# Patient Record
Sex: FEMALE | Race: WHITE | NOT HISPANIC OR LATINO | Employment: FULL TIME | URBAN - NONMETROPOLITAN AREA
[De-identification: names, ages, dates, MRNs, and addresses within clinical notes are randomized per-mention and may not be internally consistent; named-entity substitution may affect disease eponyms.]

---

## 2017-12-13 ENCOUNTER — HISTORY (OUTPATIENT)
Dept: OBGYN | Facility: OTHER | Age: 52
End: 2017-12-13

## 2017-12-13 ENCOUNTER — OFFICE VISIT - GICH (OUTPATIENT)
Dept: OBGYN | Facility: OTHER | Age: 52
End: 2017-12-13

## 2017-12-13 DIAGNOSIS — Z00.00 ENCOUNTER FOR GENERAL ADULT MEDICAL EXAMINATION WITHOUT ABNORMAL FINDINGS: ICD-10-CM

## 2017-12-13 DIAGNOSIS — D25.9 LEIOMYOMA OF UTERUS: ICD-10-CM

## 2017-12-13 DIAGNOSIS — Z78.0 ASYMPTOMATIC MENOPAUSAL STATE: ICD-10-CM

## 2017-12-13 LAB — FSH - HISTORICAL: 33.6 MIU/ML

## 2017-12-20 ENCOUNTER — HOSPITAL ENCOUNTER (OUTPATIENT)
Dept: RADIOLOGY | Facility: OTHER | Age: 52
End: 2017-12-20
Attending: OBSTETRICS & GYNECOLOGY

## 2017-12-20 DIAGNOSIS — D25.9 LEIOMYOMA OF UTERUS: ICD-10-CM

## 2017-12-26 ENCOUNTER — OFFICE VISIT - GICH (OUTPATIENT)
Dept: OBGYN | Facility: OTHER | Age: 52
End: 2017-12-26

## 2017-12-26 ENCOUNTER — HISTORY (OUTPATIENT)
Dept: OBGYN | Facility: OTHER | Age: 52
End: 2017-12-26

## 2017-12-26 DIAGNOSIS — N83.201 CYST OF RIGHT OVARY: ICD-10-CM

## 2017-12-26 DIAGNOSIS — Z78.0 ASYMPTOMATIC MENOPAUSAL STATE: ICD-10-CM

## 2017-12-26 DIAGNOSIS — D25.9 LEIOMYOMA OF UTERUS: ICD-10-CM

## 2018-02-09 VITALS
DIASTOLIC BLOOD PRESSURE: 80 MMHG | SYSTOLIC BLOOD PRESSURE: 114 MMHG | WEIGHT: 205 LBS | HEART RATE: 80 BPM | BODY MASS INDEX: 34.11 KG/M2

## 2018-02-09 VITALS
DIASTOLIC BLOOD PRESSURE: 90 MMHG | SYSTOLIC BLOOD PRESSURE: 130 MMHG | HEIGHT: 65 IN | HEART RATE: 80 BPM | WEIGHT: 203.2 LBS | BODY MASS INDEX: 33.85 KG/M2

## 2018-02-12 NOTE — PROGRESS NOTES
Patient Information     Patient Name MRN Sex Megan Mcbride [id number] Female 1965      Progress Notes by Demetrio Elias MD at 2017 11:15 AM     Author:  Demetrio Elias MD Service:  (none) Author Type:  Physician     Filed:  2017  1:26 PM Encounter Date:  2017 Status:  Signed     :  Demetrio Elias MD (Physician)            SUBJECTIVE:    Megan Garbre is a 52 y.o. female who presents for f/u of a uterine hypoechoic growth seen on u/s 2016    Gyn Exam   The patient's primary symptoms include missed menses. The patient's pertinent negatives include no genital itching, genital lesions, genital odor, genital rash, vaginal bleeding or vaginal discharge. This is a new problem. The current episode started more than 1 year ago. The patient is experiencing no pain. She is not pregnant. Associated symptoms include back pain. Pertinent negatives include no abdominal pain, anorexia, chills, constipation, diarrhea, discolored urine, dysuria, fever, flank pain, frequency, headaches, nausea, painful intercourse, rash, urgency or vomiting. She is sexually active. No, her partner does not have an STD. She uses vasectomy for contraception. There is no history of an abdominal surgery, a  section, an ectopic pregnancy, endometriosis, a gynecological surgery, herpes simplex, menorrhagia, metrorrhagia, miscarriage, ovarian cysts, PID or an STD.       No Known Allergies,   Family History      Problem  Relation Age of Onset     Cancer-breast No Family History    ,   Current Outpatient Prescriptions:      ibuprofen (ADVIL; MOTRIN) 800 mg tablet, TAKE ONE TABLET BY MOUTH EVERY 8 HOURS AS NEEDED FOR PAIN .  TAKE  WITH  FOOD., Disp: , Rfl:      sertraline (ZOLOFT) 100 mg tablet, Take 1 tablet by mouth once daily., Disp: , Rfl: 0  Medications have been reviewed by me and are current to the best of my knowledge and ability., No past medical history on file., There are no active problems to  "display for this patient.  , No past surgical history on file. and   Social History     Substance Use Topics       Smoking status: Never Smoker     Smokeless tobacco: Never Used     Alcohol use Yes       REVIEW OF SYSTEMS:  Review of Systems   Constitutional: Negative for chills and fever.   Cardiovascular: Negative for chest pain and palpitations.   Gastrointestinal: Negative for abdominal pain, anorexia, constipation, diarrhea, nausea and vomiting.   Genitourinary: Positive for missed menses. Negative for dysuria, flank pain, frequency, menorrhagia, urgency and vaginal discharge.   Musculoskeletal: Positive for back pain.   Skin: Negative for itching and rash.   Neurological: Negative for headaches.       OBJECTIVE:  /90 (Cuff Site: Right Arm, Position: Sitting, Cuff Size: Adult Large)  Pulse 80  Ht 1.651 m (5' 5\")  Wt 92.2 kg (203 lb 3.2 oz)  LMP 07/01/2017  Breastfeeding? No  BMI 33.81 kg/m2    EXAM:   Physical Exam   Constitutional: She is oriented to person, place, and time and well-developed, well-nourished, and in no distress.   HENT:   Head: Normocephalic and atraumatic.   Eyes: Pupils are equal, round, and reactive to light.   Abdominal: Soft. She exhibits no distension and no mass. There is no tenderness. There is no rebound and no guarding.   Genitourinary: Vagina normal, uterus normal, cervix normal, right adnexa normal, left adnexa normal and vulva normal.   Genitourinary Comments: Pap smear obtained.   Neurological: She is alert and oriented to person, place, and time.   Skin: Skin is warm and dry.   Psychiatric: Mood, memory, affect and judgment normal.       ASSESSMENT/PLAN:  1. H/O uterine growth (solitary fibroid)  2. Requests pap smear  3. Menopausal symptoms.     Plan:  Repeat u/s to assess for any interval change as last u/s is almost 2 years old.  FSH level to confirm menopause.          "

## 2018-02-12 NOTE — PROGRESS NOTES
Patient Information     Patient Name MRN Sex Megan Mcbride [id number] Female 1965      Progress Notes by Demetrio Elias MD at 2017 10:45 AM     Author:  Demetrio Elias MD Service:  (none) Author Type:  Physician     Filed:  2017 12:03 PM Encounter Date:  2017 Status:  Signed     :  Demetrio Elias MD (Physician)            PROGRESS NOTE    SUBJECTIVE:  Megan returns to review her test results.  FSH level was 33 and is in the menopausal range.  She is amenorrheic at this time.  Pap smear is still pending.  Her u/s found a 2.2 cm intramural fibroid, endometrial thickness of 8 mm and a 3.4 cm simple cyst of the right ovary. She has no pelvic complaints.    OBJECTIVE:  /80 (Cuff Site: Right Arm, Position: Sitting, Cuff Size: Adult Large)  Pulse 80  Wt 93 kg (205 lb)  Breastfeeding? No  BMI 34.11 kg/m2    PHYSICAL EXAM:  GENERAL APPEARANCE: Megan Garber is a normal appearing 52 y.o. female who is appropriate and cooperative.  Patient is neat and clean, and looks their stated age.  Patient is awake and alert, comfortable and in no acute distress while sitting up.       PROCEDURE: US PELVIS COMPLETE TA AND TV     HISTORY: Uterine leiomyoma, unspecified location.     TECHNIQUE: Transabdominal and transvaginal ultrasound of the pelvis.     COMPARISON: None.     FINDINGS:      Uterus: There is a 1.8 x 1.7 x 2.2 cm fibroid in the fundus, without contact of the endometrium. There are several nabothian cysts in the cervix. .  Endometrium: 8 mm.     Right ovary: There is a simple cyst measuring 3.4 x 2.9 x 2.9 cm.  Right ovary size: 3.7 x 4.2 x 3.6 cm     Left ovary: Unremarkable  Left ovary size: 1.9 x 1.8 x 1.4 cm     No free fluid is seen.     IMPRESSION:   1. 2.2 cm uterine fibroid.  2. 3.4 cm right ovarian cyst.     Electronically Signed By: Shawnee Nielson M.D. on 2017 8:18 AM    FSH mIU/mL 33.6   Resulting Agency  [facility]   Narrative   Female: Mid-Follicular Phase    (3.85-8.78)          Mid-Cycle Peak         (4.54-22.51)          Mid-Luteal Phase       (1.79-5.12)          Postmenopausal         (16..59)            ASSESSMENT & PLAN:   1. Uterine fibroid, intramural with no symptoms, no need for intervention  2. Amenorrhea, elevated FSH suggesting menopause.  No need for intervention.  3. Right Ovarian cyst, simple.  Repeat pelvic u/s in 6 months to ensure decrease or no change.  She'll call to schedule repeat u/s prior to her visit in 6 months.     Demetrio Elias MD ....................  12/26/2017   12:01 PM

## 2018-02-12 NOTE — NURSING NOTE
Patient Information     Patient Name MRN Sex Megan Mcbride [id number] Female 1965      Nursing Note by Dieudonne Will LPN at 2017 11:15 AM     Author:  Dieudonne Will LPN  Service:  (none) Author Type:  NURS- Licensed Practical Nurse     Filed:  2017 11:19 AM  Encounter Date:  2017 Status:  Addendum     :  Dieudonne Will LPN (NURS- Licensed Practical Nurse)        Related Notes: Original Note by Dieudonne Will LPN (NURS- Licensed Practical Nurse) filed at 2017 11:16 AM            Patient presents to the clinic for an ovarian cyst consult. Patient would like to get a pap done and discuss menopause. Patient has not had a period since 2017.  Dieudonne Will LPN ..............2017 11:16 AM

## 2018-02-12 NOTE — NURSING NOTE
Patient Information     Patient Name MRN Sex Megan Mcbride [id number] Female 1965      Nursing Note by Jadyn Chapin at 2017 10:45 AM     Author:  Jadyn Chapin Service:  (none) Author Type:  (none)     Filed:  2017 10:48 AM Encounter Date:  2017 Status:  Signed     :  Jadyn Chapin            She is here today to follow up after an ultrasound.  Jadyn Chapin LPN..................2017   10:45 AM

## 2018-02-14 ENCOUNTER — DOCUMENTATION ONLY (OUTPATIENT)
Dept: FAMILY MEDICINE | Facility: OTHER | Age: 53
End: 2018-02-14

## 2018-02-14 RX ORDER — SERTRALINE HYDROCHLORIDE 100 MG/1
100 TABLET, FILM COATED ORAL DAILY
COMMUNITY
Start: 2015-03-23

## 2018-02-14 RX ORDER — IBUPROFEN 800 MG/1
800 TABLET, FILM COATED ORAL EVERY 8 HOURS PRN
COMMUNITY
Start: 2017-04-06

## 2018-07-23 NOTE — PROGRESS NOTES
Patient Information     Patient Name  Megan Garber MRN  [id number] Sex  Female   1965      Letter by Demetrio Elias MD at      Author:  Demetrio Elias MD Service:  (none) Author Type:  (none)    Filed:   Encounter Date:  2017 Status:  (Other)           Megan Garber  [id number] [facility] [address]          2018    Dear Ms. Garber:    Following are the tests completed during your last clinic visit.  The results of these tests are normal and require no further attention.    Pap smear    If you have any further questions or problems contact my office at  [phone]    Thank you,    Erin MCCABE RN  Registered Nurse for [facility] providers .    Dr Demetrio Elias MD, FACOG

## 2019-02-20 ENCOUNTER — HOSPITAL ENCOUNTER (OUTPATIENT)
Dept: MAMMOGRAPHY | Facility: OTHER | Age: 54
Discharge: HOME | End: 2019-02-20
Attending: OBSTETRICS & GYNECOLOGY | Admitting: OBSTETRICS & GYNECOLOGY
Payer: COMMERCIAL

## 2019-02-20 DIAGNOSIS — Z12.39 SCREENING BREAST EXAMINATION: ICD-10-CM

## 2019-02-20 PROCEDURE — 77063 BREAST TOMOSYNTHESIS BI: CPT

## 2021-01-28 ENCOUNTER — IMMUNIZATION (OUTPATIENT)
Dept: FAMILY MEDICINE | Facility: OTHER | Age: 56
End: 2021-01-28
Attending: FAMILY MEDICINE
Payer: COMMERCIAL

## 2021-01-28 PROCEDURE — 91300 PR COVID VAC PFIZER DIL RECON 30 MCG/0.3 ML IM: CPT

## 2021-01-28 PROCEDURE — 0001A PR COVID VAC PFIZER DIL RECON 30 MCG/0.3 ML IM: CPT

## 2021-02-18 ENCOUNTER — IMMUNIZATION (OUTPATIENT)
Dept: FAMILY MEDICINE | Facility: OTHER | Age: 56
End: 2021-02-18
Attending: FAMILY MEDICINE
Payer: COMMERCIAL

## 2021-02-18 ENCOUNTER — HOSPITAL ENCOUNTER (OUTPATIENT)
Dept: MAMMOGRAPHY | Facility: OTHER | Age: 56
End: 2021-02-18
Attending: NURSE PRACTITIONER
Payer: COMMERCIAL

## 2021-02-18 DIAGNOSIS — Z12.31 VISIT FOR SCREENING MAMMOGRAM: ICD-10-CM

## 2021-02-18 PROCEDURE — 77063 BREAST TOMOSYNTHESIS BI: CPT

## 2021-02-18 PROCEDURE — 0002A PR COVID VAC PFIZER DIL RECON 30 MCG/0.3 ML IM: CPT

## 2021-02-18 PROCEDURE — 91300 PR COVID VAC PFIZER DIL RECON 30 MCG/0.3 ML IM: CPT

## 2022-08-15 ENCOUNTER — HOSPITAL ENCOUNTER (OUTPATIENT)
Dept: MAMMOGRAPHY | Facility: OTHER | Age: 57
Discharge: HOME | End: 2022-08-15
Attending: FAMILY MEDICINE | Admitting: FAMILY MEDICINE
Payer: COMMERCIAL

## 2022-08-15 DIAGNOSIS — Z12.31 VISIT FOR SCREENING MAMMOGRAM: ICD-10-CM

## 2022-08-15 PROCEDURE — 77067 SCR MAMMO BI INCL CAD: CPT

## 2022-08-22 ENCOUNTER — OFFICE VISIT (OUTPATIENT)
Dept: FAMILY MEDICINE | Facility: OTHER | Age: 57
End: 2022-08-22
Attending: NURSE PRACTITIONER
Payer: COMMERCIAL

## 2022-08-22 VITALS
HEART RATE: 75 BPM | SYSTOLIC BLOOD PRESSURE: 126 MMHG | WEIGHT: 191.13 LBS | DIASTOLIC BLOOD PRESSURE: 70 MMHG | TEMPERATURE: 98.4 F | BODY MASS INDEX: 31.8 KG/M2 | OXYGEN SATURATION: 98 % | RESPIRATION RATE: 18 BRPM

## 2022-08-22 DIAGNOSIS — J02.9 PHARYNGITIS, UNSPECIFIED ETIOLOGY: Primary | ICD-10-CM

## 2022-08-22 PROBLEM — E66.811 OBESITY (BMI 30.0-34.9): Status: ACTIVE | Noted: 2017-06-06

## 2022-08-22 PROBLEM — D68.51 HETEROZYGOUS FACTOR V LEIDEN MUTATION (H): Status: ACTIVE | Noted: 2017-06-09

## 2022-08-22 PROCEDURE — 99213 OFFICE O/P EST LOW 20 MIN: CPT | Performed by: PHYSICIAN ASSISTANT

## 2022-08-22 RX ORDER — TRIAMCINOLONE ACETONIDE 5 MG/G
CREAM TOPICAL
COMMUNITY
Start: 2021-04-16

## 2022-08-22 RX ORDER — ESTRADIOL 0.1 MG/G
CREAM VAGINAL
COMMUNITY
Start: 2022-08-12

## 2022-08-22 RX ORDER — AMOXICILLIN 500 MG/1
500 CAPSULE ORAL 2 TIMES DAILY
Qty: 20 CAPSULE | Refills: 0 | Status: SHIPPED | OUTPATIENT
Start: 2022-08-22 | End: 2022-11-05

## 2022-08-22 RX ORDER — OXYBUTYNIN CHLORIDE 5 MG/1
TABLET, EXTENDED RELEASE ORAL
COMMUNITY
Start: 2022-08-12

## 2022-08-22 ASSESSMENT — PAIN SCALES - GENERAL: PAINLEVEL: SEVERE PAIN (7)

## 2022-08-22 NOTE — PROGRESS NOTES
Assessment & Plan     1. Pharyngitis, unspecified etiology  Differential includes viral or strep pharyngitis, viral URI, COVID, influenza, RSV, asthma, allergies, GERD, esophagitis, postnasal drip, sinusitis, etc.  Due to longevity of symptoms of 4+ weeks we will treat with amoxicillin as outlined below.  Educated on medication, use, side effects.  Patient declined strep and COVID testing.  Follow-up as needed.  - amoxicillin (AMOXIL) 500 MG capsule; Take 1 capsule (500 mg) by mouth 2 times daily  Dispense: 20 capsule; Refill: 0    Return if symptoms worsen or fail to improve.    Myra Leong PA-C  [facility]    Subjective   Megan is a 57 year old, presenting for the following health issues:  Pharyngitis (Cough, sinus drainage, bilateral ear pain, fatigue x 3 weeks)      HPI   Here for evaluation of a sore throat that has been present for a little in 4 weeks.  Has had an associated on and off cough.  Associated head congestion or pressure.  No associated fever/chills, difficulties breathing, body aches, GI symptoms, rash.  Managing symptoms with NyQuil and ibuprofen which does provide some relief.  No known sick contacts.      PAST MEDICAL HISTORY: History reviewed. No pertinent past medical history.    PAST SURGICAL HISTORY: History reviewed. No pertinent surgical history.    FAMILY HISTORY:   Family History   Problem Relation Age of Onset     Breast Cancer No family hx of         Cancer-breast       SOCIAL HISTORY:   Social History     Tobacco Use     Smoking status: Never Smoker     Smokeless tobacco: Never Used   Substance Use Topics     Alcohol use: Yes      No Known Allergies  Current Outpatient Medications   Medication     amoxicillin (AMOXIL) 500 MG capsule     estradiol (ESTRACE) 0.1 MG/GM vaginal cream     ibuprofen (ADVIL/MOTRIN) 800 MG tablet     sertraline (ZOLOFT) 100 MG tablet     triamcinolone (ARISTOCORT HP) 0.5 % external cream     oxybutynin ER (DITROPAN XL) 5 MG 24 hr  tablet     No current facility-administered medications for this visit.         Review of Systems   Per HPI        Objective    /70 (BP Location: Right arm, Patient Position: Sitting, Cuff Size: Adult Large)   Pulse 75   Temp 98.4  F (36.9  C) (Tympanic)   Resp 18   Wt 86.7 kg (191 lb 2 oz)   LMP  (LMP Unknown)   SpO2 98%   Breastfeeding No   BMI 31.80 kg/m    Body mass index is 31.8 kg/m .  Physical Exam   General: Pleasant, in no apparent distress.  Eyes: Sclera are white and conjunctiva are clear bilaterally. Lacrimal apparatus free of erythema, edema, and discharge bilaterally.  Ears: External ears without erythema or edema. Tympanic membranes are pearly white and without erythema, scarring or perforations bilaterally. External auditory canals are free of foreign bodies, erythema, ulcers, and masses.  Nose: External nose is symmetrical and free of lesions and deformities.   Oropharynx: Oral mucosa is pink and without ulcers, nodules, and white patches. Tongue is symmetrical, pink, and without masses or lesions. Pharynx is erythematous, symmetrical, and without lesions. Uvula is midline. Tonsils are pink, symmetrical, and without edema, ulcers, or exudates, and 1+ bilaterally.  Cardiovascular: Regular rate and rhythm with S1 equal to S2. No murmurs, friction rubs, or gallops.   Respiratory: Lungs are resonant and clear to auscultation bilaterally. No wheezes, crackles, or rhonchi.  Psych: Appropriate mood and affect.

## 2022-08-22 NOTE — NURSING NOTE
Patient presents to clinic experiencing sore throat, cough, bilateral ear pain, fatigue and sinus drainage for past 3 weeks.  Medication Rec Complete  Shawnee Martinez LPN............8/22/2022 1:59 PM

## 2022-11-05 ENCOUNTER — HOSPITAL ENCOUNTER (EMERGENCY)
Facility: HOSPITAL | Age: 57
Discharge: HOME | End: 2022-11-05
Attending: NURSE PRACTITIONER | Admitting: NURSE PRACTITIONER
Payer: COMMERCIAL

## 2022-11-05 VITALS
TEMPERATURE: 101.7 F | RESPIRATION RATE: 18 BRPM | DIASTOLIC BLOOD PRESSURE: 99 MMHG | HEART RATE: 98 BPM | OXYGEN SATURATION: 98 % | SYSTOLIC BLOOD PRESSURE: 161 MMHG

## 2022-11-05 DIAGNOSIS — J02.0 STREP THROAT: Primary | ICD-10-CM

## 2022-11-05 DIAGNOSIS — J02.9 ACUTE PHARYNGITIS: ICD-10-CM

## 2022-11-05 LAB — GROUP A STREP BY PCR: DETECTED

## 2022-11-05 PROCEDURE — 87651 STREP A DNA AMP PROBE: CPT | Performed by: NURSE PRACTITIONER

## 2022-11-05 PROCEDURE — G0463 HOSPITAL OUTPT CLINIC VISIT: HCPCS

## 2022-11-05 PROCEDURE — 99213 OFFICE O/P EST LOW 20 MIN: CPT | Performed by: NURSE PRACTITIONER

## 2022-11-05 RX ORDER — AMOXICILLIN 500 MG/1
1000 CAPSULE ORAL 2 TIMES DAILY
Qty: 40 CAPSULE | Refills: 0 | Status: SHIPPED | OUTPATIENT
Start: 2022-11-05 | End: 2022-11-15

## 2022-11-05 ASSESSMENT — ENCOUNTER SYMPTOMS
FEVER: 1
VOICE CHANGE: 0
COUGH: 0
TROUBLE SWALLOWING: 1
SORE THROAT: 1
HEADACHES: 1
SHORTNESS OF BREATH: 0

## 2022-11-05 NOTE — DISCHARGE INSTRUCTIONS
Continue taking Tylenol and ibuprofen as needed for the pain or fever.  Drink plenty of fluids.  Do warm salt water gargles.    We will call you notify you of your results.    Follow-up with your doctor if no improvement in symptoms.    Return to emergency department for worsening or concerning symptoms.

## 2022-11-05 NOTE — ED PROVIDER NOTES
History     Chief Complaint   Patient presents with     Pharyngitis     HPI  Megan Garber is a 57 year old female who presents ambulatory to urgent care for evaluation of sore throat.  Patient tells me that she started having a headache about 5 days ago.  Over the last few days she she has since developed some dry eyes, throat discomfort and chills.  Patient tells me that she initially thought that some of her symptoms were due to oxybutynin.  Now she notes that it is very painful for her to swallow and she has a white spots to the back of her throat.  She was noted to have a fever upon arrival to this facility.  She denies any cough, shortness of breath or chest pain.  Patient declines COVID-19 testing at this time.    She has been taking Tylenol and ibuprofen with last doses taken around 830 today.  She declines any medication for pain or fever during this visit.    Allergies:  No Known Allergies    Problem List:    Patient Active Problem List    Diagnosis Date Noted     Heterozygous factor V Leiden mutation (H) 06/09/2017     Priority: Medium     Obesity (BMI 30.0-34.9) 06/06/2017     Priority: Medium     Anxiety 01/26/2016     Priority: Medium     Dysthymic disorder 08/17/2011     Priority: Medium     Family history of diabetes mellitus 12/21/2009     Priority: Medium     Migraine without intractable migraine 12/21/2009     Priority: Medium     Formatting of this note might be different from the original.  IMO Update 10/11          Past Medical History:    History reviewed. No pertinent past medical history.    Past Surgical History:    History reviewed. No pertinent surgical history.    Family History:    Family History   Problem Relation Age of Onset     Breast Cancer No family hx of         Cancer-breast       Social History:  Marital Status:   [2]  Social History     Tobacco Use     Smoking status: Never     Smokeless tobacco: Never   Vaping Use     Vaping Use: Never used   Substance Use Topics      Alcohol use: Yes     Drug use: Never     Comment: Drug use: No        Medications:    amoxicillin (AMOXIL) 500 MG capsule  estradiol (ESTRACE) 0.1 MG/GM vaginal cream  ibuprofen (ADVIL/MOTRIN) 800 MG tablet  oxybutynin ER (DITROPAN XL) 5 MG 24 hr tablet  sertraline (ZOLOFT) 100 MG tablet  triamcinolone (ARISTOCORT HP) 0.5 % external cream          Review of Systems   Constitutional: Positive for fever.   HENT: Positive for sore throat and trouble swallowing. Negative for voice change.    Respiratory: Negative for cough and shortness of breath.    Neurological: Positive for headaches.   All other systems reviewed and are negative.      Physical Exam   BP: 161/99  Pulse: 98  Temp: (!) 101.7  F (38.7  C)  Resp: 18  SpO2: 98 %      Physical Exam  Vitals and nursing note reviewed.   Constitutional:       Appearance: She is well-developed. She is not ill-appearing.   HENT:      Head: Atraumatic.      Right Ear: Tympanic membrane and ear canal normal.      Left Ear: Tympanic membrane and ear canal normal.      Mouth/Throat:      Mouth: Mucous membranes are moist.      Pharynx: Uvula midline. Posterior oropharyngeal erythema present. No uvula swelling.      Tonsils: Tonsillar exudate present. No tonsillar abscesses. 1+ on the right. 2+ on the left.   Eyes:      Pupils: Pupils are equal, round, and reactive to light.   Cardiovascular:      Rate and Rhythm: Normal rate and regular rhythm.   Pulmonary:      Effort: Pulmonary effort is normal. No respiratory distress.      Breath sounds: Normal breath sounds.   Musculoskeletal:      Cervical back: Neck supple.   Lymphadenopathy:      Cervical: Cervical adenopathy present.   Skin:     General: Skin is warm.      Capillary Refill: Capillary refill takes less than 2 seconds.   Neurological:      Mental Status: She is alert and oriented to person, place, and time.         ED Course                 Procedures              Results for orders placed or performed during the hospital  encounter of 11/05/22 (from the past 24 hour(s))   Group A Streptococcus PCR Throat Swab    Specimen: Throat; Swab   Result Value Ref Range    Group A strep by PCR Detected (A) Not Detected    Narrative    The Xpert Xpress Strep A test, performed on the [employer], is a rapid, qualitative in vitro diagnostic test for the detection of Streptococcus pyogenes (Group A ß-hemolytic Streptococcus, Strep A) in throat swab specimens from patients with signs and symptoms of pharyngitis. The Xpert Xpress Strep A test can be used as an aid in the diagnosis of Group A Streptococcal pharyngitis. The assay is not intended to monitor treatment for Group A Streptococcus infections. The Xpert Xpress Strep A test utilizes an automated real-time polymerase chain reaction (PCR) to detect Streptococcus pyogenes DNA.       Medications - No data to display    Assessments & Plan (with Medical Decision Making)     I have reviewed the nursing notes.    57-year-old female that presented for evaluation of a sore throat with symptoms having initially started about 4 days ago.  Patient noted to have a fever upon arrival to this facility.  She does have bilateral tonsillar hypertrophy with exudate.  No trismus.  No obvious tonsillar abscess.  Patient declined any medication for her fever or pain during this visit.  She did test positive for strep.  Amoxicillin as prescribed.  Recommended continue with Tylenol or ibuprofen as needed for pain or fever.  Push fluids.  Warm salt water gargles.  Follow-up with primary medical provider as needed.  Return to ED/UC for worsening or concerning symptoms.    I have reviewed the findings, diagnosis, plan and need for follow up with the patient.  This document was prepared using a combination of typing and voice generated software.  While every attempt was made for accuracy, spelling and grammatical errors may exist.    Discharge Medication List as of 11/5/2022  2:13 PM          Final  diagnoses:   Acute pharyngitis   Strep throat       11/5/2022   [facility]     Mpofu, Prudence, CNP  11/05/22 [id number]

## 2022-11-05 NOTE — ED TRIAGE NOTES
Patient presents today with a sore throat and chills since Tuesday. She is febrile and just generally doesn't feel well. No SOB or distress noted.      Triage Assessment     Row Name 11/05/22 [id number]       Triage Assessment (Adult)    Airway WDL WDL       Respiratory WDL    Respiratory WDL WDL       Skin Circulation/Temperature WDL    Skin Circulation/Temperature WDL WDL       Cardiac WDL    Cardiac WDL WDL       Peripheral/Neurovascular WDL    Peripheral Neurovascular WDL WDL       Cognitive/Neuro/Behavioral WDL    Cognitive/Neuro/Behavioral WDL WDL

## 2022-11-05 NOTE — ED TRIAGE NOTES
Pt presents with c/o fever (did not take temp at home), sore throat, states there are white patches all over throat. Sx started Tuesday. Pt states she has been taking tylenol and ibuprofen. Pt had ibuprofen and one 200mg tylenol at 0830 am.      Triage Assessment     Row Name 11/05/22 [id number]       Triage Assessment (Adult)    Airway WDL WDL       Respiratory WDL    Respiratory WDL WDL       Skin Circulation/Temperature WDL    Skin Circulation/Temperature WDL WDL       Cardiac WDL    Cardiac WDL WDL       Peripheral/Neurovascular WDL    Peripheral Neurovascular WDL WDL       Cognitive/Neuro/Behavioral WDL    Cognitive/Neuro/Behavioral WDL WDL

## 2023-03-21 ENCOUNTER — OFFICE VISIT (OUTPATIENT)
Dept: FAMILY MEDICINE | Facility: OTHER | Age: 58
End: 2023-03-21
Attending: NURSE PRACTITIONER
Payer: COMMERCIAL

## 2023-03-21 VITALS
HEART RATE: 79 BPM | OXYGEN SATURATION: 98 % | RESPIRATION RATE: 16 BRPM | TEMPERATURE: 98 F | DIASTOLIC BLOOD PRESSURE: 88 MMHG | HEIGHT: 65 IN | WEIGHT: 190 LBS | BODY MASS INDEX: 31.65 KG/M2 | SYSTOLIC BLOOD PRESSURE: 132 MMHG

## 2023-03-21 DIAGNOSIS — R43.2 LOSS OF TASTE: ICD-10-CM

## 2023-03-21 DIAGNOSIS — J34.89 PAIN OF MAXILLARY SINUS: ICD-10-CM

## 2023-03-21 DIAGNOSIS — R43.0 LOSS OF SMELL: Primary | ICD-10-CM

## 2023-03-21 DIAGNOSIS — J01.90 ACUTE SINUSITIS WITH SYMPTOMS > 10 DAYS: ICD-10-CM

## 2023-03-21 DIAGNOSIS — R09.81 NASAL CONGESTION: ICD-10-CM

## 2023-03-21 LAB — SARS-COV-2 RNA RESP QL NAA+PROBE: NEGATIVE

## 2023-03-21 PROCEDURE — 99213 OFFICE O/P EST LOW 20 MIN: CPT | Mod: CS | Performed by: NURSE PRACTITIONER

## 2023-03-21 PROCEDURE — U0005 INFEC AGEN DETEC AMPLI PROBE: HCPCS | Mod: ZL | Performed by: NURSE PRACTITIONER

## 2023-03-21 PROCEDURE — C9803 HOPD COVID-19 SPEC COLLECT: HCPCS | Performed by: NURSE PRACTITIONER

## 2023-03-21 ASSESSMENT — PAIN SCALES - GENERAL: PAINLEVEL: SEVERE PAIN (6)

## 2023-03-21 NOTE — PROGRESS NOTES
ASSESSMENT/PLAN:  I have reviewed the nursing notes.  I have reviewed the findings, diagnosis, plan and need for follow up with the patient.    1. Loss of smell  2. Loss of taste  3. Nasal congestion  4. Pain of maxillary sinus  - Symptomatic COVID-19 Virus (Coronavirus) by PCR Nose  -Symptomatic treatment - Encouraged fluids, salt water gargles, honey (only if greater than 1 year in age due to risk of botulism), elevation, humidifier, sinus rinse/netti pot, lozenges, tea, topical vapor rub, popsicles, rest, etc   -May use over-the-counter Tylenol or ibuprofen PRN    5. Acute sinusitis with symptoms > 10 days  Patient has had 2 negative home COVID test as well as 1 negative COVID test here.  Given that she does have exam findings and history consistent with acute sinusitis and double sickening lasting greater than 10 days I did opt to prescribe antibiotic.  We discussed risk of antibiotic resistance and the potential that this could be caused a viral illness like COVID despite 3 negative COVID tests given her loss of taste and smell.  At this point I feel benefit outweighs the risk for trial of antibiotics.  Take full course.  Continue Arco pot/saline rinses and also recommend Flonase nasal steroid.  - amoxicillin-clavulanate (AUGMENTIN) 875-125 MG tablet; Take 1 tablet by mouth 2 times daily for 10 days  Dispense: 20 tablet; Refill: 0    Follow up if symptoms persist or worsen or concerns    I explained my diagnostic considerations and recommendations to the patient, who voiced understanding and agreement with the treatment plan. All questions were answered. We discussed potential side effects of any prescribed or recommended therapies, as well as expectations for response to treatments.    Jania Castanon NP  3/21/2023  11:12 AM    HPI:  Megan Garber is a 58 year old female who presents to [facility] today for concerns of severe nasal congestion x10 days. Fever (up to 99 only) started last night and had  "chills. No fever or chills prior to last night. She reports complete loss of taste and smell x6 days, which has never happened to her before. Some dry cough as well without shortness of breath. No prior history of sinus infections. She had 2 Neg covid test on Saturday 3/18 as well as 3/20 at home. Teeth also hurt. Worse on the left.     She thought she was improving for 2 days or so before sinus pain returned and was worse.  She has taken day quil and other OTC meds. Used netti pot yesterday.      is not sick. Megan works at a school. Had sore throat which has gone away since. She has had strep before; states this does not feel at all like strep.     ROS otherwise negative.     No past medical history on file.  No past surgical history on file.  Social History     Tobacco Use     Smoking status: Never     Smokeless tobacco: Never   Substance Use Topics     Alcohol use: Yes     Current Outpatient Medications   Medication Sig Dispense Refill     amoxicillin-clavulanate (AUGMENTIN) 875-125 MG tablet Take 1 tablet by mouth 2 times daily for 10 days 20 tablet 0     estradiol (ESTRACE) 0.1 MG/GM vaginal cream INSERT 2 GRAMS INTRAVAGINALLY EACH NIGHT AT BEDTIME FOR 2 WEEKS, THEN DECREASE TO 1 GRAM TWICE A WEEK AT BEDTIME       ibuprofen (ADVIL/MOTRIN) 800 MG tablet Take 800 mg by mouth every 8 hours as needed       oxybutynin ER (DITROPAN XL) 5 MG 24 hr tablet        sertraline (ZOLOFT) 100 MG tablet Take 100 mg by mouth daily       triamcinolone (ARISTOCORT HP) 0.5 % external cream        No Known Allergies  Past medical history, past surgical history, current medications and allergies reviewed and accurate to the best of my knowledge.      ROS:  Refer to HPI    /88 (BP Location: Left arm, Patient Position: Sitting, Cuff Size: Adult Regular)   Pulse 79   Temp 98  F (36.7  C) (Tympanic)   Resp 16   Ht 1.638 m (5' 4.5\")   Wt 86.2 kg (190 lb)   LMP  (LMP Unknown)   SpO2 98%   BMI 32.11 kg/m  "     EXAM:  General Appearance: Well appearing 58 year old female, appropriate appearance for age. No acute distress   Ears: Left TM intact, translucent with bony landmarks appreciated, no erythema, + serous effusion, no bulging, no purulence.  Right TM intact, translucent with bony landmarks appreciated, no erythema, no effusion, no bulging, no purulence.  Left auditory canal clear.  Right auditory canal clear.  Normal external ears, non tender.  Eyes: conjunctivae normal without erythema or irritation, corneas clear, no drainage or crusting, no eyelid swelling, pupils equal   Oropharynx: moist mucous membranes, posterior pharynx without erythema, tonsils symmetric, no erythema, no exudates or petechiae, no post nasal drip seen, no trismus, voice clear.    Sinuses:  + bilateral sinus tenderness upon palpation of the maxillary sinuses L>R  Nose:  Bilateral nares: no erythema, no edema, no drainage or congestion   Neck: supple without adenopathy  Respiratory: normal chest wall and respirations.  Normal effort.  Clear to auscultation bilaterally, no wheezing, crackles or rhonchi.  No increased work of breathing.  No cough appreciated.  Cardiac: RRR with no murmurs  Psychological: normal affect, alert, oriented, and pleasant.     Results for orders placed or performed in visit on 03/21/23   Symptomatic COVID-19 Virus (Coronavirus) by PCR Nose     Status: Normal    Specimen: Nose; Swab   Result Value Ref Range    SARS CoV2 PCR Negative Negative    Narrative    Testing was performed using the Xpert Xpress SARS-CoV-2 Assay on the Cepheid Gene-Xpert Instrument Systems. Additional information about this Emergency Use Authorization (EUA) assay can be found via the Lab Guide. This test should be ordered for the detection of SARS-CoV-2 in individuals who meet SARS-CoV-2 clinical and/or epidemiological criteria as well as from individuals without symptoms or other reasons to suspect COVID-19. Test performance for asymptomatic  patients has only been established in anterior nasal swab specimens. This test is for in vitro diagnostic use under the FDA EUA for laboratories certified under CLIA to perform high complexity testing. This test has not been FDA cleared or approved. A negative result does not rule out the presence of PCR inhibitors in the specimen or target RNA concentration below the limit of detection for the assay. The possibility of a false negative should be considered if the patient's recent exposure or clinical presentation suggests COVID-19. This test was validated by [facility]. This laboratory is certified under the Clinical Laboratory Improvement Amendments (CLIA) as qualified to perform high complexity clinical laboratory testing.

## 2023-03-21 NOTE — NURSING NOTE
Chief Complaint   Patient presents with     Nasal Congestion     10 days - neg home covid Monday     Fever     Last night with chills     Cough     Loss of taste and smell x 6 days     Patient treating with ibuprofen and dayquil  Two neg covid tests Monday 3/20 and Sat 3/18  Loss of taste and smell 6 days ago    Advanced Care Planning on file? no    Medication Review Completed: complete    FOOD SECURITY SCREENING QUESTIONS:    The next two questions are to help us understand your food security.  If you are feeling you need any assistance in this area, we have resources available to support you today.    Hunger Vital Signs:  Within the past 12 months we worried whether our food would run out before we got money to buy more. Never  Within the past 12 months the food we bought just didn't last and we didn't have money to get more. Never    Livier Vo LPN

## 2023-03-21 NOTE — LETTER
March 21, 2023                                                                     To Whom it May Concern:    Megan TILLMAN Garber attended clinic here on Mar 21, 2023.       Sincerely,    Jania Castanon NP

## 2024-02-18 ENCOUNTER — HEALTH MAINTENANCE LETTER (OUTPATIENT)
Age: 59
End: 2024-02-18

## 2024-11-24 ENCOUNTER — HEALTH MAINTENANCE LETTER (OUTPATIENT)
Age: 59
End: 2024-11-24

## 2024-12-01 ENCOUNTER — OFFICE VISIT (OUTPATIENT)
Dept: FAMILY MEDICINE | Facility: OTHER | Age: 59
End: 2024-12-01
Attending: NURSE PRACTITIONER
Payer: COMMERCIAL

## 2024-12-01 VITALS
WEIGHT: 173 LBS | RESPIRATION RATE: 16 BRPM | TEMPERATURE: 98 F | SYSTOLIC BLOOD PRESSURE: 158 MMHG | OXYGEN SATURATION: 98 % | DIASTOLIC BLOOD PRESSURE: 108 MMHG | HEART RATE: 83 BPM | HEIGHT: 64 IN | BODY MASS INDEX: 29.53 KG/M2

## 2024-12-01 DIAGNOSIS — R35.0 URINARY FREQUENCY: ICD-10-CM

## 2024-12-01 DIAGNOSIS — R30.0 BURNING WITH URINATION: ICD-10-CM

## 2024-12-01 DIAGNOSIS — N30.01 ACUTE CYSTITIS WITH HEMATURIA: Primary | ICD-10-CM

## 2024-12-01 DIAGNOSIS — I10 BENIGN ESSENTIAL HYPERTENSION: ICD-10-CM

## 2024-12-01 LAB
ALBUMIN UR-MCNC: NEGATIVE MG/DL
APPEARANCE UR: CLEAR
BACTERIA #/AREA URNS HPF: ABNORMAL /HPF
BILIRUB UR QL STRIP: NEGATIVE
COLOR UR AUTO: ABNORMAL
GLUCOSE UR STRIP-MCNC: NEGATIVE MG/DL
HGB UR QL STRIP: ABNORMAL
KETONES UR STRIP-MCNC: NEGATIVE MG/DL
LEUKOCYTE ESTERASE UR QL STRIP: ABNORMAL
MUCOUS THREADS #/AREA URNS LPF: PRESENT /LPF
NITRATE UR QL: NEGATIVE
PH UR STRIP: 5 [PH] (ref 5–9)
RBC URINE: 16 /HPF
SP GR UR STRIP: 1.02 (ref 1–1.03)
SQUAMOUS EPITHELIAL: 1 /HPF
UROBILINOGEN UR STRIP-MCNC: NORMAL MG/DL
WBC URINE: 83 /HPF

## 2024-12-01 PROCEDURE — 81001 URINALYSIS AUTO W/SCOPE: CPT | Mod: ZL | Performed by: REGISTERED NURSE

## 2024-12-01 PROCEDURE — 99204 OFFICE O/P NEW MOD 45 MIN: CPT | Performed by: REGISTERED NURSE

## 2024-12-01 PROCEDURE — 87186 SC STD MICRODIL/AGAR DIL: CPT | Mod: ZL | Performed by: REGISTERED NURSE

## 2024-12-01 RX ORDER — HYDROCHLOROTHIAZIDE 12.5 MG/1
1 CAPSULE ORAL DAILY
COMMUNITY
Start: 2024-09-15

## 2024-12-01 RX ORDER — NITROFURANTOIN 25; 75 MG/1; MG/1
100 CAPSULE ORAL 2 TIMES DAILY
Qty: 10 CAPSULE | Refills: 0 | Status: SHIPPED | OUTPATIENT
Start: 2024-12-01 | End: 2024-12-06

## 2024-12-01 ASSESSMENT — PAIN SCALES - GENERAL: PAINLEVEL_OUTOF10: EXTREME PAIN (8)

## 2024-12-01 NOTE — NURSING NOTE
"Chief Complaint   Patient presents with    Urinary Problem     X 4 days    Urgency     Patient not tx symptoms today  Tx last night with tylenol pm    Initial BP (!) 158/108 (BP Location: Left arm, Patient Position: Sitting, Cuff Size: Adult Regular)   Pulse 83   Temp 98  F (36.7  C) (Tympanic)   Resp 16   Ht 1.626 m (5' 4\")   Wt 78.5 kg (173 lb)   LMP  (LMP Unknown)   SpO2 98%   BMI 29.70 kg/m   Estimated body mass index is 29.7 kg/m  as calculated from the following:    Height as of this encounter: 1.626 m (5' 4\").    Weight as of this encounter: 78.5 kg (173 lb).     Advance Care Directive on file? n    FOOD SECURITY SCREENING QUESTIONS:    The next two questions are to help us understand your food security.  If you are feeling you need any assistance in this area, we have resources available to support you today.    Hunger Vital Signs:  Within the past 12 months we worried whether our food would run out before we got money to buy more. Never  Within the past 12 months the food we bought just didn't last and we didn't have money to get more. Never  Livier Vo LPN,ZAC on 12/1/2024 at 1:58 PM      Livier Vo LPN     "

## 2024-12-01 NOTE — PATIENT INSTRUCTIONS
Start and finish antibiotic therapy. If your culture grows something not covered by this antibiotic you will be notified and your antibiotic will be changed at that time.    Push fluids.     Discuss your blood pressure tomorrow with your provider.

## 2024-12-01 NOTE — PROGRESS NOTES
Megan Garber  1965    ASSESSMENT/PLAN:   1. Acute cystitis with hematuria (Primary)  2. Burning with urination  3. Urinary frequency    - nitroFURantoin macrocrystal-monohydrate (MACROBID) 100 MG capsule; Take 1 capsule (100 mg) by mouth 2 times daily for 5 days.  Dispense: 10 capsule; Refill: 0  - UA Macroscopic with reflex to Microscopic and Culture  - Urine Culture    Urine analysis today showing active urinary tract infection.  Antibiotic therapy as noted above and follow urine culture.  Patient advised to push fluids and follow-up for any new or worsening concerns.    4.  Benign essential hypertension    Blood pressure elevated today.  Patient denies lightheadedness, dizziness, shortness of breath.  Has been taking hydrochlorothiazide as directed.  She has a wellness visit tomorrow with her PCP. Patient was advised to take her blood pressure tonight and again tomorrow morning and bring these numbers in to discuss further with her PCP.    Patient agrees with plan of care and verbalizes understating. AVS printed. Patient education provided verbally and written instructions provided as requested.     SUBJECTIVE:   CHIEF COMPLAINT/ REASON FOR VISIT  Patient presents with:  Urinary Problem: X 4 days  Urgency     HISTORY OF PRESENT ILLNESS  Megan Garber is a pleasant 59 year old female [facility] evaluation of dysuria, urgency and frequency for the past 4 days.  She has not had a recent UTI.  Denies fevers, recent diarrhea, CVA tenderness or suprapubic tenderness    Her blood pressure is elevated in clinic today.  She is surprised by this.  She has been taking her hydrochlorothiazide as directed.  Denies chest pain, lightheadedness, dizziness or shortness of breath.  She has a wellness appointment tomorrow with her PCP.        I have reviewed the nursing notes.  I have reviewed allergies, medication list, problem list, and past medical history.    REVIEW OF SYSTEMS  See HPI    VITAL  "SIGNS  Vitals:    12/01/24 1356 12/01/24 1358   BP: (!) 158/118 (!) 158/108   BP Location: Left arm Left arm   Patient Position: Sitting Sitting   Cuff Size: Adult Regular Adult Regular   Pulse: 83    Resp: 16    Temp: 98  F (36.7  C)    TempSrc: Tympanic    SpO2: 98%    Weight: 78.5 kg (173 lb)    Height: 1.626 m (5' 4\")       Body mass index is 29.7 kg/m .    OBJECTIVE:   PHYSICAL EXAM  Physical Exam  Vitals and nursing note reviewed.   Constitutional:       Appearance: Normal appearance. She is not ill-appearing.   Cardiovascular:      Rate and Rhythm: Normal rate and regular rhythm.   Pulmonary:      Effort: Pulmonary effort is normal.      Breath sounds: Normal breath sounds.   Abdominal:      General: Abdomen is flat. Bowel sounds are normal. There is no distension.      Tenderness: There is no abdominal tenderness. There is no right CVA tenderness or left CVA tenderness.   Neurological:      General: No focal deficit present.   Psychiatric:         Mood and Affect: Mood normal.          DIAGNOSTICS  Results for orders placed or performed in visit on 12/01/24   UA Macroscopic with reflex to Microscopic and Culture     Status: Abnormal    Specimen: Urine, Clean Catch   Result Value Ref Range    Color Urine Light Yellow Colorless, Straw, Light Yellow, Yellow    Appearance Urine Clear Clear    Glucose Urine Negative Negative mg/dL    Bilirubin Urine Negative Negative    Ketones Urine Negative Negative mg/dL    Specific Gravity Urine 1.016 1.000 - 1.030    Blood Urine Small (A) Negative    pH Urine 5.0 5.0 - 9.0    Protein Albumin Urine Negative Negative mg/dL    Urobilinogen Urine Normal Normal, 2.0 mg/dL    Nitrite Urine Negative Negative    Leukocyte Esterase Urine Large (A) Negative    Bacteria Urine Few (A) None Seen /HPF    Mucus Urine Present (A) None Seen /LPF    RBC Urine 16 (H) <=2 /HPF    WBC Urine 83 (H) <=5 /HPF    Squamous Epithelials Urine 1 <=1 /HPF    Narrative    Urine Culture ordered based on " laboratory criteria        ALEX Henderson [facility]

## 2024-12-03 LAB — BACTERIA UR CULT: ABNORMAL

## 2025-03-09 ENCOUNTER — HEALTH MAINTENANCE LETTER (OUTPATIENT)
Age: 60
End: 2025-03-09

## 2025-08-14 ENCOUNTER — OFFICE VISIT (OUTPATIENT)
Dept: FAMILY MEDICINE | Facility: OTHER | Age: 60
End: 2025-08-14
Payer: COMMERCIAL

## 2025-08-14 VITALS
SYSTOLIC BLOOD PRESSURE: 108 MMHG | DIASTOLIC BLOOD PRESSURE: 64 MMHG | OXYGEN SATURATION: 96 % | HEART RATE: 97 BPM | TEMPERATURE: 98.3 F | WEIGHT: 168.8 LBS | BODY MASS INDEX: 28.97 KG/M2

## 2025-08-14 DIAGNOSIS — R30.0 DYSURIA: ICD-10-CM

## 2025-08-14 DIAGNOSIS — N30.01 ACUTE CYSTITIS WITH HEMATURIA: Primary | ICD-10-CM

## 2025-08-14 LAB
ALBUMIN UR-MCNC: NEGATIVE MG/DL
APPEARANCE UR: ABNORMAL
BACTERIA #/AREA URNS HPF: ABNORMAL /HPF
BILIRUB UR QL STRIP: NEGATIVE
COLOR UR AUTO: ABNORMAL
GLUCOSE UR STRIP-MCNC: NEGATIVE MG/DL
HGB UR QL STRIP: ABNORMAL
HYALINE CASTS: 1 /LPF
KETONES UR STRIP-MCNC: NEGATIVE MG/DL
LEUKOCYTE ESTERASE UR QL STRIP: ABNORMAL
NITRATE UR QL: NEGATIVE
PH UR STRIP: 5 [PH] (ref 5–9)
RBC URINE: 37 /HPF
SP GR UR STRIP: 1.02 (ref 1–1.03)
UROBILINOGEN UR STRIP-MCNC: NORMAL MG/DL
WBC CLUMPS #/AREA URNS HPF: PRESENT /HPF
WBC URINE: >182 /HPF

## 2025-08-14 PROCEDURE — 81003 URINALYSIS AUTO W/O SCOPE: CPT | Mod: ZL | Performed by: NURSE PRACTITIONER

## 2025-08-14 RX ORDER — PHENTERMINE HYDROCHLORIDE 37.5 MG/1
1 TABLET ORAL DAILY
COMMUNITY
Start: 2025-08-04

## 2025-08-14 RX ORDER — NITROFURANTOIN 25; 75 MG/1; MG/1
100 CAPSULE ORAL 2 TIMES DAILY
Qty: 10 CAPSULE | Refills: 0 | Status: SHIPPED | OUTPATIENT
Start: 2025-08-14 | End: 2025-08-19

## 2025-08-16 LAB — BACTERIA UR CULT: ABNORMAL
